# Patient Record
Sex: FEMALE
[De-identification: names, ages, dates, MRNs, and addresses within clinical notes are randomized per-mention and may not be internally consistent; named-entity substitution may affect disease eponyms.]

---

## 2020-10-30 ENCOUNTER — TRANSCRIPTION ENCOUNTER (OUTPATIENT)
Age: 80
End: 2020-10-30

## 2023-04-27 PROBLEM — Z00.00 ENCOUNTER FOR PREVENTIVE HEALTH EXAMINATION: Status: ACTIVE | Noted: 2023-04-27

## 2023-06-30 ENCOUNTER — APPOINTMENT (OUTPATIENT)
Dept: HEPATOLOGY | Facility: CLINIC | Age: 83
End: 2023-06-30
Payer: MEDICARE

## 2023-06-30 VITALS
SYSTOLIC BLOOD PRESSURE: 108 MMHG | TEMPERATURE: 97.5 F | HEART RATE: 71 BPM | DIASTOLIC BLOOD PRESSURE: 64 MMHG | HEIGHT: 62 IN | OXYGEN SATURATION: 95 % | WEIGHT: 161 LBS | RESPIRATION RATE: 16 BRPM | BODY MASS INDEX: 29.63 KG/M2

## 2023-06-30 DIAGNOSIS — E78.00 PURE HYPERCHOLESTEROLEMIA, UNSPECIFIED: ICD-10-CM

## 2023-06-30 DIAGNOSIS — K59.09 OTHER CONSTIPATION: ICD-10-CM

## 2023-06-30 DIAGNOSIS — Z83.3 FAMILY HISTORY OF DIABETES MELLITUS: ICD-10-CM

## 2023-06-30 DIAGNOSIS — E11.9 TYPE 2 DIABETES MELLITUS W/OUT COMPLICATIONS: ICD-10-CM

## 2023-06-30 DIAGNOSIS — Z87.19 PERSONAL HISTORY OF OTHER DISEASES OF THE DIGESTIVE SYSTEM: ICD-10-CM

## 2023-06-30 DIAGNOSIS — Z80.0 FAMILY HISTORY OF MALIGNANT NEOPLASM OF DIGESTIVE ORGANS: ICD-10-CM

## 2023-06-30 DIAGNOSIS — I10 ESSENTIAL (PRIMARY) HYPERTENSION: ICD-10-CM

## 2023-06-30 DIAGNOSIS — K74.60 UNSPECIFIED CIRRHOSIS OF LIVER: ICD-10-CM

## 2023-06-30 LAB
INR PPP: 1.08 RATIO
PT BLD: 12.8 SEC

## 2023-06-30 PROCEDURE — 99204 OFFICE O/P NEW MOD 45 MIN: CPT

## 2023-06-30 RX ORDER — PANTOPRAZOLE SODIUM 40 MG/1
40 GRANULE, DELAYED RELEASE ORAL
Refills: 0 | Status: ACTIVE | COMMUNITY

## 2023-06-30 RX ORDER — DOCUSATE SODIUM 50 MG/5ML
100 LIQUID ORAL
Refills: 0 | Status: ACTIVE | COMMUNITY

## 2023-06-30 RX ORDER — ALENDRONATE SODIUM 70 MG/1
70 TABLET ORAL
Refills: 0 | Status: ACTIVE | COMMUNITY

## 2023-06-30 RX ORDER — PROPRANOLOL HYDROCHLORIDE 10 MG/1
10 TABLET ORAL
Refills: 0 | Status: ACTIVE | COMMUNITY

## 2023-06-30 RX ORDER — LEVOTHYROXINE SODIUM 50 UG/1
50 CAPSULE ORAL
Refills: 0 | Status: ACTIVE | COMMUNITY

## 2023-06-30 RX ORDER — LISINOPRIL 2.5 MG/1
2.5 TABLET ORAL
Refills: 0 | Status: ACTIVE | COMMUNITY

## 2023-06-30 RX ORDER — GABAPENTIN 300 MG/1
300 CAPSULE ORAL
Refills: 0 | Status: ACTIVE | COMMUNITY

## 2023-06-30 RX ORDER — SITAGLIPTIN AND METFORMIN HYDROCHLORIDE 50; 500 MG/1; MG/1
50-500 TABLET, FILM COATED ORAL
Refills: 0 | Status: ACTIVE | COMMUNITY

## 2023-06-30 RX ORDER — POLYETHYLENE GLYCOL 3350 17 G/17G
17 POWDER, FOR SOLUTION ORAL DAILY
Qty: 30 | Refills: 1 | Status: ACTIVE | COMMUNITY
Start: 2023-06-30

## 2023-07-02 PROBLEM — K74.60 CIRRHOSIS: Status: ACTIVE | Noted: 2023-06-30

## 2023-07-02 PROBLEM — Z87.19 HISTORY OF ESOPHAGEAL VARICES WITH BLEEDING: Status: RESOLVED | Noted: 2023-07-02 | Resolved: 2023-07-02

## 2023-07-02 LAB
A1AT SERPL-MCNC: 147 MG/DL
AFP-TM SERPL-MCNC: <1.8 NG/ML
ALBUMIN SERPL ELPH-MCNC: 4 G/DL
ALP BLD-CCNC: 132 U/L
ALT SERPL-CCNC: 24 U/L
ANION GAP SERPL CALC-SCNC: 16 MMOL/L
AST SERPL-CCNC: 31 U/L
BILIRUB DIRECT SERPL-MCNC: 0.2 MG/DL
BILIRUB INDIRECT SERPL-MCNC: 0.4 MG/DL
BILIRUB SERPL-MCNC: 0.7 MG/DL
BUN SERPL-MCNC: 18 MG/DL
CALCIUM SERPL-MCNC: 9.5 MG/DL
CERULOPLASMIN SERPL-MCNC: 28 MG/DL
CHLORIDE SERPL-SCNC: 106 MMOL/L
CO2 SERPL-SCNC: 19 MMOL/L
CREAT SERPL-MCNC: 0.73 MG/DL
EGFR: 81 ML/MIN/1.73M2
FERRITIN SERPL-MCNC: 16 NG/ML
GLUCOSE SERPL-MCNC: 86 MG/DL
HBV CORE IGG+IGM SER QL: NONREACTIVE
HBV SURFACE AB SER QL: NONREACTIVE
HBV SURFACE AG SER QL: NONREACTIVE
HCV AB SER QL: NONREACTIVE
HCV S/CO RATIO: 0.12 S/CO
HEPATITIS A IGG ANTIBODY: REACTIVE
HIV1+2 AB SPEC QL IA.RAPID: NONREACTIVE
IRON SATN MFR SERPL: 18 %
IRON SERPL-MCNC: 64 UG/DL
POTASSIUM SERPL-SCNC: 4.8 MMOL/L
PROT SERPL-MCNC: 7.7 G/DL
SODIUM SERPL-SCNC: 141 MMOL/L
TIBC SERPL-MCNC: 360 UG/DL
UIBC SERPL-MCNC: 296 UG/DL

## 2023-07-02 NOTE — ASSESSMENT
[FreeTextEntry1] : 82 yo Female with Hx of HTN, Type 2 DM (on OAD, Dx in her 50s), HTN, HLD, and cirrhosis, was referred by Dr. Olivo for hr cirrhosis. \par No prior ascites, but she noted worsening abdominal distention.\par She reports hospitalization in 11/2022 at [facility] w/ hematemesis. No records, but reports that it was EV bleed and been on propranolol since then. \par \par Cirrhosis decompensated by prior EV bleed\par - Will get labs to caluclate MELD\par - I have explained and educated pt at length the natural history of cirrhosis, complications of cirrhosis with portal hypertension, esophageal varices with bleeding, portosystemic encephalopathy, ascites and infection, and  risk of HCC.\par - No large ascites on physical exam, no Hx of ascites, no hx of SBP in past , not on SBP prophylaxis, not on diuretics. Normal BUN/Cr.\par - No signs of overt hepatic encephalopathy. Advised to avoid constipation. Has Miralax, not taking, advised to take PRN.\par - Esophageal varix (EV) screening: Last EGD? Hx of EV bleed. C/w propranolol current dose. (only on 10 mg daily, but BP borderline). Will need to get prior EGD report and likely will need repeat one. \par - HCC screening: ORdered US abd and AFP.\par - Etiology: CARRASCO? Ordered CLD BW.\par \par Health maintenance:\par Will get Hepatitis serologies, vit D level, will address bone health next visit.\par Counseled on liver healthy diet, including but not limited to avoiding alcohol, illicit drug, avoiding eating any unpasteurized dairy products; avoiding eating any raw or undercooked eggs, fish, poultry, or meat; and avoiding eating raw/steamed oysters or other shellfish to avoid risk of Vibrio infection. \par Advised on avoiding use of herbal and dietary supplements due to potential hepatotoxicity, and limit use of acetaminophen to <2 grams per day. Advised on avoiding use of nonsteroidal antiinflammatory drugs (NSAIDs) as these can precipitate renal dysfunction in patients with advanced liver disease. \par \par RTC 2-3 weeks w/ above results\par

## 2023-07-02 NOTE — HISTORY OF PRESENT ILLNESS
[FreeTextEntry1] : 82 yo Female with Hx of HTN, Type 2 DM (on OAD, Dx in her 50s), HTN, HLD, and cirrhosis, was referred by Dr. Olivo for hr cirrhosis. \par No prior ascites, but she noted worsening abdominal distention.\par She reports hospitalization in 11/2022 at [facility] w/ hematemesis. No records, but reports that it was EV bleed and been on propranolol since then.

## 2023-07-02 NOTE — REVIEW OF SYSTEMS
[Constipation] : constipation [Negative] : ENT [Easy Bruising] : a tendency for easy bruising [Fever] : no fever [Chills] : no chills [Recent Weight Gain (___ Lbs)] : no recent weight gain [Recent Weight Loss (___ Lbs)] : no recent weight loss [Chest Pain] : no chest pain [Palpitations] : no palpitations [Lower Ext Edema] : no lower extremity edema [Shortness Of Breath] : no shortness of breath [Cough] : no cough [SOB on Exertion] : no shortness of breath during exertion [Abdominal Pain] : no abdominal pain [Vomiting] : no vomiting [Diarrhea] : no diarrhea [Dysuria] : no dysuria [Itching] : no itching [Confused] : no confusion [FreeTextEntry7] : Abdominal distention. No hematochezia.  [FreeTextEntry9] : using cane

## 2023-07-02 NOTE — PHYSICAL EXAM
[Scleral Icterus] : No Scleral Icterus [Spider Angioma] : No spider angioma(s) were observed [Jaundice] : No jaundice [Palmar Erythema] : Palmar Erythema [Depression] : no depression [General Appearance - Alert] : alert [General Appearance - In No Acute Distress] : in no acute distress [General Appearance - Well Nourished] : well nourished [General Appearance - Well Developed] : well developed [Sclera] : the sclera and conjunctiva were normal [Oropharynx] : the oropharynx was normal [Neck Appearance] : the appearance of the neck was normal [Jugular Venous Distention Increased] : there was no jugular-venous distention [] : no respiratory distress [Respiration, Rhythm And Depth] : normal respiratory rhythm and effort [Exaggerated Use Of Accessory Muscles For Inspiration] : no accessory muscle use [Auscultation Breath Sounds / Voice Sounds] : lungs were clear to auscultation bilaterally [Heart Rate And Rhythm] : heart rate was normal and rhythm regular [Edema] : there was no peripheral edema [Rounded] : rounded [Normal] : normal [Dilated Collat. Veins] : no collateral vein dilation [Soft, Nontender] : the abdomen was soft and nontender [Firm] : not firm [Rigid] : not rigid [Rebound] : no rebound [Guarding] : no guarding [None] : no CVA tenderness [Cervical Lymph Nodes Enlarged Posterior Bilaterally] : posterior cervical [Cervical Lymph Nodes Enlarged Anterior Bilaterally] : anterior cervical [Supraclavicular Lymph Nodes Enlarged Bilaterally] : supraclavicular [Axillary Lymph Nodes Enlarged Bilaterally] : axillary [Femoral Lymph Nodes Enlarged Bilaterally] : femoral [Inguinal Lymph Nodes Enlarged Bilaterally] : inguinal [No CVA Tenderness] : no ~M costovertebral angle tenderness [No Spinal Tenderness] : no spinal tenderness [Skin Color & Pigmentation] : normal skin color and pigmentation [Oriented To Time, Place, And Person] : oriented to person, place, and time [FreeTextEntry1] : Grossly intact [Impaired Insight] : insight and judgment were intact [Affect] : the affect was normal [Mood] : the mood was normal

## 2023-08-11 LAB
ANA SER IF-ACNC: NEGATIVE
DEPRECATED KAPPA LC FREE/LAMBDA SER: 0.78 RATIO
IGA SER QL IEP: 880 MG/DL
IGG SER QL IEP: 1356 MG/DL
IGM SER QL IEP: 212 MG/DL
KAPPA LC CSF-MCNC: 5.39 MG/DL
KAPPA LC SERPL-MCNC: 4.19 MG/DL
LKM AB SER QL IF: <20.1 UNITS
MITOCHONDRIA AB SER IF-ACNC: NORMAL
SMOOTH MUSCLE AB SER QL IF: NORMAL

## 2023-09-08 ENCOUNTER — APPOINTMENT (OUTPATIENT)
Dept: HEPATOLOGY | Facility: CLINIC | Age: 83
End: 2023-09-08